# Patient Record
Sex: FEMALE | Race: WHITE | Employment: OTHER | URBAN - NONMETROPOLITAN AREA
[De-identification: names, ages, dates, MRNs, and addresses within clinical notes are randomized per-mention and may not be internally consistent; named-entity substitution may affect disease eponyms.]

---

## 2017-08-16 ENCOUNTER — HOSPITAL ENCOUNTER (OUTPATIENT)
Dept: WOMENS IMAGING | Age: 52
Discharge: HOME | End: 2017-08-16
Payer: COMMERCIAL

## 2017-08-16 DIAGNOSIS — Z12.31 VISIT FOR SCREENING MAMMOGRAM: ICD-10-CM

## 2017-08-16 PROCEDURE — 77063 BREAST TOMOSYNTHESIS BI: CPT

## 2018-04-17 ENCOUNTER — HOSPITAL ENCOUNTER (OUTPATIENT)
Dept: NON INVASIVE DIAGNOSTICS | Age: 53
Discharge: HOME | End: 2018-04-17
Payer: COMMERCIAL

## 2018-04-17 PROCEDURE — 93225 XTRNL ECG REC<48 HRS REC: CPT

## 2018-04-17 PROCEDURE — 93226 XTRNL ECG REC<48 HR SCAN A/R: CPT

## 2018-09-02 ENCOUNTER — HOSPITAL ENCOUNTER (EMERGENCY)
Age: 53
Discharge: HOME | End: 2018-09-02
Attending: EMERGENCY MEDICINE
Payer: COMMERCIAL

## 2018-09-02 VITALS
BODY MASS INDEX: 31.99 KG/M2 | OXYGEN SATURATION: 96 % | WEIGHT: 216 LBS | TEMPERATURE: 98.2 F | RESPIRATION RATE: 18 BRPM | SYSTOLIC BLOOD PRESSURE: 137 MMHG | DIASTOLIC BLOOD PRESSURE: 85 MMHG | HEART RATE: 74 BPM | HEIGHT: 69 IN
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
## 2018-09-02 PROCEDURE — 99284 EMERGENCY DEPT VISIT MOD MDM: CPT

## 2018-09-02 PROCEDURE — 96372 THER/PROPH/DIAG INJ SC/IM: CPT

## 2018-09-02 PROCEDURE — 6360000002 HC RX W HCPCS: Performed by: EMERGENCY MEDICINE

## 2018-09-02 RX ORDER — CYCLOBENZAPRINE HCL 5 MG
5 TABLET ORAL 2 TIMES DAILY PRN
Qty: 6 TABLET | Refills: 0 | Status: SHIPPED | OUTPATIENT
Start: 2018-09-02 | End: 2018-09-05

## 2018-09-02 RX ORDER — KETOROLAC TROMETHAMINE 30 MG/ML
30 INJECTION, SOLUTION INTRAMUSCULAR; INTRAVENOUS ONCE
Status: DISCONTINUED | OUTPATIENT
Start: 2018-09-02 | End: 2018-09-02 | Stop reason: HOSPADM

## 2018-09-02 RX ORDER — ORPHENADRINE CITRATE 30 MG/ML
60 INJECTION INTRAMUSCULAR; INTRAVENOUS ONCE
Status: COMPLETED | OUTPATIENT
Start: 2018-09-02 | End: 2018-09-02

## 2018-09-02 RX ADMIN — ORPHENADRINE CITRATE 60 MG: 30 INJECTION INTRAMUSCULAR; INTRAVENOUS at 12:30

## 2018-09-02 ASSESSMENT — ENCOUNTER SYMPTOMS
SHORTNESS OF BREATH: 0
NAUSEA: 0
VOMITING: 0
BLOOD IN STOOL: 0
WHEEZING: 0
ABDOMINAL PAIN: 0
SORE THROAT: 0
BACK PAIN: 0
CONSTIPATION: 0
DIARRHEA: 0
RHINORRHEA: 0
COUGH: 0

## 2018-09-02 ASSESSMENT — PAIN DESCRIPTION - FREQUENCY: FREQUENCY: CONTINUOUS

## 2018-09-02 ASSESSMENT — PAIN DESCRIPTION - LOCATION: LOCATION: SHOULDER;NECK

## 2018-09-02 ASSESSMENT — PAIN SCALES - GENERAL: PAINLEVEL_OUTOF10: 2

## 2018-09-02 ASSESSMENT — PAIN DESCRIPTION - PAIN TYPE: TYPE: ACUTE PAIN;CHRONIC PAIN

## 2018-09-02 ASSESSMENT — PAIN DESCRIPTION - ORIENTATION: ORIENTATION: RIGHT;LEFT
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
## 2018-12-27 ENCOUNTER — HOSPITAL ENCOUNTER (OUTPATIENT)
Dept: MRI IMAGING | Age: 53
Discharge: HOME | End: 2018-12-27
Payer: COMMERCIAL

## 2018-12-27 DIAGNOSIS — M46.1 SACROILIITIS (HCC): ICD-10-CM

## 2018-12-27 PROCEDURE — 72195 MRI PELVIS W/O DYE: CPT

## 2020-09-01 ENCOUNTER — HOSPITAL ENCOUNTER (OUTPATIENT)
Dept: ULTRASOUND IMAGING | Age: 55
Discharge: HOME | End: 2020-09-01
Payer: COMMERCIAL

## 2020-09-01 ENCOUNTER — HOSPITAL ENCOUNTER (OUTPATIENT)
Dept: WOMENS IMAGING | Age: 55
Discharge: HOME | End: 2020-09-01
Payer: COMMERCIAL

## 2020-09-01 PROCEDURE — 77063 BREAST TOMOSYNTHESIS BI: CPT

## 2020-09-01 PROCEDURE — 76536 US EXAM OF HEAD AND NECK: CPT

## 2020-09-11 ENCOUNTER — HOSPITAL ENCOUNTER (OUTPATIENT)
Dept: WOMENS IMAGING | Age: 55
Discharge: HOME | End: 2020-09-11
Payer: COMMERCIAL

## 2020-09-11 PROCEDURE — G0279 TOMOSYNTHESIS, MAMMO: HCPCS

## 2020-09-11 PROCEDURE — 76642 ULTRASOUND BREAST LIMITED: CPT

## 2021-09-02 ENCOUNTER — HOSPITAL ENCOUNTER (OUTPATIENT)
Dept: WOMENS IMAGING | Age: 56
Discharge: HOME | End: 2021-09-02
Payer: COMMERCIAL

## 2021-09-02 DIAGNOSIS — Z12.31 VISIT FOR SCREENING MAMMOGRAM: ICD-10-CM

## 2021-09-02 PROCEDURE — 77063 BREAST TOMOSYNTHESIS BI: CPT

## 2021-09-08 ENCOUNTER — HOSPITAL ENCOUNTER (OUTPATIENT)
Dept: WOMENS IMAGING | Age: 56
Discharge: HOME | End: 2021-09-08
Payer: COMMERCIAL

## 2021-09-08 DIAGNOSIS — N64.4 BREAST PAIN, LEFT: ICD-10-CM

## 2021-09-08 DIAGNOSIS — N64.4 BREAST PAIN: ICD-10-CM

## 2021-09-08 PROCEDURE — 76642 ULTRASOUND BREAST LIMITED: CPT

## 2022-11-02 ENCOUNTER — HOSPITAL ENCOUNTER (OUTPATIENT)
Dept: WOMENS IMAGING | Age: 57
Discharge: HOME | End: 2022-11-02
Payer: COMMERCIAL

## 2022-11-02 DIAGNOSIS — Z12.31 VISIT FOR SCREENING MAMMOGRAM: ICD-10-CM

## 2022-11-02 PROCEDURE — 77063 BREAST TOMOSYNTHESIS BI: CPT

## 2023-02-26 ENCOUNTER — HOSPITAL ENCOUNTER (EMERGENCY)
Age: 58
Discharge: HOME | End: 2023-02-26
Attending: STUDENT IN AN ORGANIZED HEALTH CARE EDUCATION/TRAINING PROGRAM
Payer: COMMERCIAL

## 2023-02-26 ENCOUNTER — APPOINTMENT (OUTPATIENT)
Dept: CT IMAGING | Age: 58
End: 2023-02-26
Payer: COMMERCIAL

## 2023-02-26 ENCOUNTER — APPOINTMENT (OUTPATIENT)
Dept: GENERAL RADIOLOGY | Age: 58
End: 2023-02-26
Payer: COMMERCIAL

## 2023-02-26 VITALS
HEART RATE: 79 BPM | TEMPERATURE: 98.3 F | OXYGEN SATURATION: 96 % | DIASTOLIC BLOOD PRESSURE: 84 MMHG | WEIGHT: 240 LBS | RESPIRATION RATE: 16 BRPM | HEIGHT: 70 IN | SYSTOLIC BLOOD PRESSURE: 132 MMHG | BODY MASS INDEX: 34.36 KG/M2

## 2023-02-26 DIAGNOSIS — R91.1 LUNG NODULE SEEN ON IMAGING STUDY: Primary | ICD-10-CM

## 2023-02-26 LAB
ALBUMIN SERPL BCG-MCNC: 3.9 G/DL (ref 3.5–5.1)
ALP SERPL-CCNC: 98 U/L (ref 38–126)
ALT SERPL W/O P-5'-P-CCNC: 73 U/L (ref 11–66)
ANION GAP SERPL CALC-SCNC: 12 MEQ/L (ref 8–16)
AST SERPL-CCNC: 88 U/L (ref 5–40)
BILIRUB CONJ SERPL-MCNC: < 0.2 MG/DL (ref 0–0.3)
BILIRUB SERPL-MCNC: 0.5 MG/DL (ref 0.3–1.2)
BUN SERPL-MCNC: 16 MG/DL (ref 7–22)
CALCIUM SERPL-MCNC: 9.5 MG/DL (ref 8.5–10.5)
CHLORIDE SERPL-SCNC: 100 MEQ/L (ref 98–111)
CO2 SERPL-SCNC: 23 MEQ/L (ref 23–33)
CREAT SERPL-MCNC: 0.8 MG/DL (ref 0.4–1.2)
D DIMER PPP IA.FEU-MCNC: 1881 NG/ML FEU (ref 0–500)
FLUAV RNA RESP QL NAA+PROBE: NOT DETECTED
FLUBV RNA RESP QL NAA+PROBE: NOT DETECTED
GFR SERPL CREATININE-BSD FRML MDRD: > 60 ML/MIN/1.73M2
GLUCOSE SERPL-MCNC: 107 MG/DL (ref 70–108)
NT-PROBNP SERPL IA-MCNC: 46.7 PG/ML (ref 0–124)
OSMOLALITY SERPL CALC.SUM OF ELEC: 271.8 MOSMOL/KG (ref 275–300)
POTASSIUM SERPL-SCNC: 3.5 MEQ/L (ref 3.5–5.2)
PROT SERPL-MCNC: 7.5 G/DL (ref 6.1–8)
SARS-COV-2 RNA RESP QL NAA+PROBE: NOT DETECTED
SCAN OF BLOOD SMEAR: NORMAL
SODIUM SERPL-SCNC: 135 MEQ/L (ref 135–145)
TROPONIN T: < 0.01 NG/ML

## 2023-02-26 PROCEDURE — 93005 ELECTROCARDIOGRAM TRACING: CPT | Performed by: EMERGENCY MEDICINE

## 2023-02-26 PROCEDURE — 83880 ASSAY OF NATRIURETIC PEPTIDE: CPT

## 2023-02-26 PROCEDURE — 85025 COMPLETE CBC W/AUTO DIFF WBC: CPT

## 2023-02-26 PROCEDURE — 93010 ELECTROCARDIOGRAM REPORT: CPT | Performed by: NUCLEAR MEDICINE

## 2023-02-26 PROCEDURE — 36415 COLL VENOUS BLD VENIPUNCTURE: CPT

## 2023-02-26 PROCEDURE — 6360000004 HC RX CONTRAST MEDICATION

## 2023-02-26 PROCEDURE — 84484 ASSAY OF TROPONIN QUANT: CPT

## 2023-02-26 PROCEDURE — 71046 X-RAY EXAM CHEST 2 VIEWS: CPT

## 2023-02-26 PROCEDURE — 87636 SARSCOV2 & INF A&B AMP PRB: CPT

## 2023-02-26 PROCEDURE — 80053 COMPREHEN METABOLIC PANEL: CPT

## 2023-02-26 PROCEDURE — 85379 FIBRIN DEGRADATION QUANT: CPT

## 2023-02-26 PROCEDURE — 99285 EMERGENCY DEPT VISIT HI MDM: CPT

## 2023-02-26 PROCEDURE — 82248 BILIRUBIN DIRECT: CPT

## 2023-02-26 PROCEDURE — 71275 CT ANGIOGRAPHY CHEST: CPT

## 2023-02-26 RX ORDER — ADALIMUMAB 40MG/0.4ML
KIT SUBCUTANEOUS
COMMUNITY

## 2023-02-26 RX ORDER — MELOXICAM 7.5 MG/1
7.5 TABLET ORAL DAILY
COMMUNITY

## 2023-02-26 RX ORDER — FOLIC ACID 1 MG/1
1 TABLET ORAL DAILY
COMMUNITY

## 2023-02-26 RX ORDER — MULTIVIT WITH MINERALS/LUTEIN
1000 TABLET ORAL DAILY
COMMUNITY

## 2023-02-26 RX ADMIN — IOPAMIDOL 80 ML: 755 INJECTION, SOLUTION INTRAVENOUS at 13:33

## 2023-02-26 ASSESSMENT — PAIN SCALES - GENERAL
PAINLEVEL_OUTOF10: 2
PAINLEVEL_OUTOF10: 2

## 2023-02-26 ASSESSMENT — PAIN DESCRIPTION - LOCATION
LOCATION: BACK
LOCATION: BACK

## 2023-02-26 ASSESSMENT — PAIN - FUNCTIONAL ASSESSMENT
PAIN_FUNCTIONAL_ASSESSMENT: 0-10
PAIN_FUNCTIONAL_ASSESSMENT: 0-10

## 2023-02-26 ASSESSMENT — PAIN DESCRIPTION - ORIENTATION: ORIENTATION: MID

## 2023-02-26 NOTE — ED NOTES
Patient resting on cart. Patient complains of shortness of breath. Respirations regular and unlabored. Patient updated on plan of additional blood lab.  at bedside. Call light in reach.      Debi Carrillo RN  02/26/23 4472

## 2023-02-26 NOTE — ED NOTES
Patient resting on cart. Patient updated on plan of care and pending CTA study. Patient denies further needs at this time. Respirations unlabored at this time. Call light in reach.      Kayla Smiley RN  02/26/23 2945

## 2023-02-26 NOTE — ED PROVIDER NOTES
[facility] [address] [address] EMERGENCY DEPT      EMERGENCY MEDICINE     Pt Name: Christal Romo  MRN: [id number]  [place] 1965  Date of evaluation: 2/26/2023  Resident Physician: Juana Mcguire MD  Attending Physician: Dr. Aravind Echeverria       Chief Complaint   Patient presents with    Shortness of Breath     HISTORY OF PRESENT ILLNESS   Christal Romo is a 61 y.o. female who presents to the emergency department from home, by private vehicle for evaluation of shortness of breath and fatigue    Patient states that she has rheumatoid arthritis and is taking Humira as well as methotrexate combined with folic acid but does occasionally become severely anemic which causes her symptoms that she is experiencing now and thinks that is the case. Patient says roughly every 3 to 6 months she does get iron injections through Dr. Le Rosario office for this. Patient has not had any recent URIs, chest pain, abdominal pain, diarrhea or dysuria. Patient is denying any coughs. Patient denies any recent sick contacts. Patient is a non-smoker denies any alcohol or recreational drug use. Patient without any recent surgeries. Patient is not on any estrogen therapy. Patient has never had a blood clot in the legs or lungs before. Patient denies history of cancer. Patient says her only other medical history is hypertension for which she takes blood pressure medication for. The patient has no other acute complaints at this time. Review of Systems    Negative Except as Documented Above. PASTMEDICAL HISTORY     Past Medical History:   Diagnosis Date    Anxiety     Arthritis     BRCA1 negative 11/15/2016    Myriad 28 gene panel-NEGATIVE    BRCA2 negative 11/15/2016    Cervical pain     Depression     GERD (gastroesophageal reflux disease)     R[facility] ([facility]        There is no problem list on file for this patient.     SURGICAL HISTORY       Past Surgical History:   Procedure Laterality Date    APPENDECTOMY HYSTERECTOMY (CERVIX STATUS UNKNOWN)         CURRENT MEDICATIONS       Current Discharge Medication List        CONTINUE these medications which have NOT CHANGED    Details   meloxicam (MOBIC) 7.5 MG tablet Take 7.5 mg by mouth daily      folic acid (FOLVITE) 1 MG tablet Take 1 mg by mouth daily      vitamin E 1000 units capsule Take 1,000 Units by mouth daily      methotrexate (RHEUMATREX) 2.5 MG chemo tablet Take 10 mg by mouth once a week      Adalimumab (HUMIRA PEN) 40 MG/0.4ML PNKT Inject into the skin every 14 days      metaxalone (SKELAXIN) 800 MG tablet Take 800 mg by mouth 3 times daily      sertraline (ZOLOFT) 100 MG tablet Take 50 mg by mouth daily      pantoprazole (PROTONIX) 20 MG tablet Take 40 mg by mouth daily             ALLERGIES     is allergic to morphine. FAMILY HISTORY     She indicated that her mother is . She indicated that her father is . She indicated that the status of her brother is unknown. She indicated that the status of her maternal grandmother is unknown. She indicated that the status of her maternal grandfather is unknown. She indicated that the status of her paternal grandfather is unknown. She indicated that her maternal aunt is alive. She indicated that her paternal aunt is alive. She indicated that the status of her maternal cousin is unknown. SOCIAL HISTORY       Social History     Tobacco Use    Smoking status: Some Days    Smokeless tobacco: Never   Substance Use Topics    Alcohol use: Yes     Comment: occas    Drug use: No       PHYSICAL EXAM       ED Triage Vitals [23 0920]   BP Temp Temp Source Heart Rate Resp SpO2 Height Weight   128/81 98.3 °F (36.8 °C) Oral 88 16 97 % 5' 10\" (1.778 m) 240 lb (108.9 kg)       Physical Exam  General: Lying in bed no obvious distress  HEENT:  normocephalic and atraumatic. No scleral icterus. PERR. EOMI moist mucous membranes  Neck: Supple. No JVD. No thyromegaly. Lungs: clear to auscultation.   No retractions, No evidence of Respiratory Distress   Cardiac: RRR without MGR, Bilateral Radial and DP pulses 2+  Abdomen: soft. Nontender. Nondistended, Bowel Sounds Present  Extremities:  No clubbing, cyanosis, or edema x 4. Cap Refill < 2 Seconds    Skin:  warm and dry. No rashes or bruises  Psych:  Alert and oriented x3. Affect appropriate  Lymph:  No supraclavicular adenopathy. Neurologic:  No focal deficit. No seizures. FORMAL DIAGNOSTIC RESULTS     RADIOLOGY: Interpretation per the Radiologist below, if available at the time of this note (none if blank):    CTA CHEST W WO CONTRAST PE Eval   Final Result       1. No pulmonary emboli. 2. New 1.2 cm right middle lobe pulmonary nodule. There is some surrounding groundglass attenuation. There is associated right hilar and mediastinal adenopathy. While this could be infectious/inflammatory in nature, malignancy is a consideration. A 3    month follow up chest CT or PET/CT is recommended for further evaluation. **This report has been created using voice recognition software. It may contain minor errors which are inherent in voice recognition technology. **      Final report electronically signed by Dr. Dariana Geronimo on 2/26/2023 1:48 PM      XR CHEST (2 VW)   Final Result   Normal chest.               **This report has been created using voice recognition software. It may contain minor errors which are inherent in voice recognition technology. **      Final report electronically signed by Dr. Dariana Geronimo on 2/26/2023 10:10 AM          LABS: (none if blank)  Labs Reviewed   CBC WITH AUTO DIFFERENTIAL - Abnormal; Notable for the following components:       Result Value    RDW-SD 49.1 (*)     All other components within normal limits   HEPATIC FUNCTION PANEL - Abnormal; Notable for the following components:    AST 88 (*)     ALT 73 (*)     All other components within normal limits   OSMOLALITY - Abnormal; Notable for the following components: Osmolality Calc 271.8 (*)     All other components within normal limits   D-DIMER, QUANTITATIVE - Abnormal; Notable for the following components:    D-Dimer, Quant 1881.00 (*)     All other components within normal limits   COVID-19 & INFLUENZA COMBO   BRAIN NATRIURETIC PEPTIDE   BASIC METABOLIC PANEL   TROPONIN   ANION GAP   GLOMERULAR FILTRATION RATE, ESTIMATED   SCAN OF BLOOD SMEAR       (Any cultures that may have been sent were not resulted at the time of this patient visit)    [address] / ED COURSE:     1) Number and Complexity of Problems            Problem List This Visit:         Chief Complaint   Patient presents with    Shortness of Breath            Differential Diagnosis includes (but not limited to): COVID, flu, pneumonia, other viral URI, CHF, COPD, pneumothorax, restrictive lung diseases, anemia, electrolyte disturbances                 Pertinent Comorbid Conditions:    Rheumatoid arthritis on methotrexate and Humira    2)  Data Reviewed (none if left blank)          My Independent interpretations:     EKG:      Normal sinus rhythm with normal rates and intervals    Imaging: See Ed Course    Labs:      See Ed Course                 Decision Rules/Clinical Scores utilized:              External Documentation Reviewed:         Previous patient encounter documents & history available on EMR was reviewed              See Formal Diagnostic Results above for the lab and radiology tests and orders. 3)  Treatment and Disposition         ED Reassessment: Upon reassessment, patient updated on labs and need to get additional laboratory test to screen for pulmonary embolism. Patient expressed understanding and agreement. Patient has no further complaints this time. Please see ED course for further reassessments, treatments, MDM, and final disposition.             Case discussed with consulting clinician:           Shared Decision-Making was performed and disposition discussed with the Patient/Family and questions answered          Social determinants of health impacting treatment or disposition:           Code Status:        Summary of Patient Presentation:      MDM   /   ED Course as of 02/26/23 1430   Sun Feb 26, 2023   1427 Patient's initial work-up was negative so we added on a D-dimer to screen for possible pulmonary embolism. When I came back elevated we did get a CTA of the chest.  No PE was identified but there was a lung nodule present that was read as infectious versus inflammatory versus malignancy. I discussed these results with the patient at length and the importance of follow-up. Discussed with him the recommendation of repeat CT scan in 3 months as well as following up with her oncologist Dr. Radha Orona. Patient understandably tearful and frustrated. Patient's vitals have been normal the entire time with no discernible cause of the patient's subjective shortness of breath other than this lung nodule. Shared decision-making was performed and patient does agree that she is suitable for discharge at this time. Patient understands follow-up instructions and is able to repeat them back to me. Patient has no further concerns at this time. [BENI]      ED Course User Index  [BENI] Desire Villagran MD     Vitals Reviewed:    Vitals:    02/26/23 0920 02/26/23 1034 02/26/23 1145 02/26/23 1230   BP: 128/81 127/78 130/79 132/86   Pulse: 88 77 81 82   Resp: 16 16     Temp: 98.3 °F (36.8 °C)      TempSrc: Oral      SpO2: 97% 98% 96% 96%   Weight: 240 lb (108.9 kg)      Height: 5' 10\" (1.778 m)          The patient was seen and examined. Appropriate diagnostic testing was performed and results reviewed with the patient. The results of pertinent diagnostic studies and exam findings were discussed. The patients provisional diagnosis and plan of care were discussed with the patient and present family who expressed understanding.  Any medications were reviewed and indications and risks of medications were discussed with the patient /family present. Strict verbal and written return precautions, instructions and appropriate follow-up provided to  the patient . ED Medications administered this visit:  (None if blank)  Medications   iopamidol (ISOVUE-370) 76 % injection 80 mL (80 mLs IntraVENous Given 2/26/23 1333)         PROCEDURES: (None if blank)  Procedures:     CRITICAL CARE: (Please see Attending note / Alton Cheung regarding Critical Care Time. )      DISCHARGE PRESCRIPTIONS: (None if blank)  Current Discharge Medication List          FINAL IMPRESSION      1. Lung nodule seen on imaging study          DISPOSITION/PLAN   DISPOSITION Decision To Discharge 02/26/2023 02:20:49 PM      OUTPATIENT FOLLOW UP THE PATIENT:  [facility] EMERGENCY DEPT  [address] [address]  [phone]  Go to   If symptoms worsen    Tim Malave MD  [address] [place] [address]  [phone]    Call in 3 days      [address]  [address] [place] [address] [address] [address]  [phone]    Call in 1 day      Daymon Kocher, MD    This transcription was electronically signed. Parts of this transcriptions may have been dictated by use of voice recognition software and electronically transcribed, and parts may have been transcribed with the assistance of an ED scribe. The transcription may contain errors not detected in proofreading. Please refer to my supervising physician's documentation if my documentation differs.     Electronically Signed: Daymon Kocher, MD, 02/26/23, 2:30 PM        Eloisa De Paz MD  Resident  02/26/23 4137

## 2023-02-26 NOTE — DISCHARGE INSTRUCTIONS
Your labs today do not demonstrate any significant pathology however CT scan of your chest does demonstrate a lung nodule that is concerning for inflammatory, infectious and possible malignancy. You will need a repeat CT scan in 3 months to reevaluate as well as possible biopsy depending on the results of that. It will be important to follow-up closely with your primary care doctor and your oncologist following this visit today for further work-up.

## 2023-02-27 LAB
AUTO DIFF PNL BLD: ABNORMAL
BASOPHILS ABSOLUTE: 0 THOU/MM3 (ref 0–0.1)
BASOPHILS NFR BLD AUTO: 0.5 %
DEPRECATED RDW RBC AUTO: 49.1 FL (ref 35–45)
EKG ATRIAL RATE: 90 BPM
EKG P AXIS: 50 DEGREES
EKG P-R INTERVAL: 174 MS
EKG Q-T INTERVAL: 348 MS
EKG QRS DURATION: 84 MS
EKG QTC CALCULATION (BAZETT): 425 MS
EKG R AXIS: 93 DEGREES
EKG T AXIS: 24 DEGREES
EKG VENTRICULAR RATE: 90 BPM
EOSINOPHIL NFR BLD AUTO: 0.2 %
EOSINOPHILS ABSOLUTE: 0 THOU/MM3 (ref 0–0.4)
ERYTHROCYTE [DISTWIDTH] IN BLOOD BY AUTOMATED COUNT: 14.4 % (ref 11.5–14.5)
HCT VFR BLD AUTO: 40.5 % (ref 37–47)
HGB BLD-MCNC: 13.3 GM/DL (ref 12–16)
IMM GRANULOCYTES # BLD AUTO: 0.05 THOU/MM3 (ref 0–0.07)
IMM GRANULOCYTES NFR BLD AUTO: 0.8 %
LYMPHOCYTES ABSOLUTE: 3.9 THOU/MM3 (ref 1–4.8)
LYMPHOCYTES NFR BLD AUTO: 60.9 %
MCH RBC QN AUTO: 30.7 PG (ref 26–33)
MCHC RBC AUTO-ENTMCNC: 32.8 GM/DL (ref 32.2–35.5)
MCV RBC AUTO: 93.5 FL (ref 81–99)
MONOCYTES ABSOLUTE: 0.3 THOU/MM3 (ref 0.4–1.3)
MONOCYTES NFR BLD AUTO: 4.8 %
NEUTROPHILS NFR BLD AUTO: 32.8 %
NRBC BLD AUTO-RTO: 0 /100 WBC
PATHOLOGIST REVIEW: ABNORMAL
PLATELET # BLD AUTO: 172 THOU/MM3 (ref 130–400)
PLATELET BLD QL SMEAR: ADEQUATE
PMV BLD AUTO: 9.3 FL (ref 9.4–12.4)
RBC # BLD AUTO: 4.33 MILL/MM3 (ref 4.2–5.4)
SEGMENTED NEUTROPHILS ABSOLUTE COUNT: 2.1 THOU/MM3 (ref 1.8–7.7)
VARIANT LYMPHS BLD QL SMEAR: ABNORMAL %
WBC # BLD AUTO: 6.4 THOU/MM3 (ref 4.8–10.8)

## 2023-10-11 ENCOUNTER — HOSPITAL ENCOUNTER (OUTPATIENT)
Dept: CT IMAGING | Age: 58
Discharge: HOME | End: 2023-10-11
Attending: INTERNAL MEDICINE
Payer: COMMERCIAL

## 2023-10-11 DIAGNOSIS — R51.9 LEFT-SIDED HEADACHE: ICD-10-CM

## 2023-10-11 DIAGNOSIS — R91.1 PULMONARY NODULE: ICD-10-CM

## 2023-10-11 DIAGNOSIS — R51.0 ORTHOSTATIC HEADACHE: ICD-10-CM

## 2023-10-11 PROCEDURE — 70450 CT HEAD/BRAIN W/O DYE: CPT

## 2023-10-11 PROCEDURE — 71250 CT THORAX DX C-: CPT

## 2023-10-23 ENCOUNTER — APPOINTMENT (OUTPATIENT)
Dept: CT IMAGING | Age: 58
End: 2023-10-23
Payer: COMMERCIAL

## 2023-10-23 ENCOUNTER — HOSPITAL ENCOUNTER (EMERGENCY)
Age: 58
Discharge: HOME | End: 2023-10-23
Attending: EMERGENCY MEDICINE
Payer: COMMERCIAL

## 2023-10-23 VITALS
DIASTOLIC BLOOD PRESSURE: 100 MMHG | WEIGHT: 225 LBS | OXYGEN SATURATION: 98 % | RESPIRATION RATE: 16 BRPM | HEIGHT: 70 IN | SYSTOLIC BLOOD PRESSURE: 156 MMHG | BODY MASS INDEX: 32.21 KG/M2 | HEART RATE: 76 BPM | TEMPERATURE: 98.4 F

## 2023-10-23 DIAGNOSIS — R91.1 PULMONARY NODULE: ICD-10-CM

## 2023-10-23 DIAGNOSIS — N20.0 KIDNEY STONE: Primary | ICD-10-CM

## 2023-10-23 LAB
ALBUMIN SERPL BCG-MCNC: 4.2 G/DL (ref 3.5–5.1)
ALP SERPL-CCNC: 99 U/L (ref 38–126)
ALT SERPL W/O P-5'-P-CCNC: 13 U/L (ref 11–66)
ANION GAP SERPL CALC-SCNC: 13 MEQ/L (ref 8–16)
AST SERPL-CCNC: 18 U/L (ref 5–40)
B-HCG SERPL QL: NEGATIVE
BACTERIA URNS QL MICRO: ABNORMAL /HPF
BASOPHILS ABSOLUTE: 0 THOU/MM3 (ref 0–0.1)
BASOPHILS NFR BLD AUTO: 0.4 %
BILIRUB CONJ SERPL-MCNC: < 0.2 MG/DL (ref 0–0.3)
BILIRUB SERPL-MCNC: 0.3 MG/DL (ref 0.3–1.2)
BILIRUB UR QL STRIP.AUTO: NEGATIVE
BUN SERPL-MCNC: 19 MG/DL (ref 7–22)
CALCIUM SERPL-MCNC: 9.5 MG/DL (ref 8.5–10.5)
CASTS #/AREA URNS LPF: ABNORMAL /LPF
CASTS 2: ABNORMAL /LPF
CHARACTER UR: CLEAR
CHLORIDE SERPL-SCNC: 100 MEQ/L (ref 98–111)
CO2 SERPL-SCNC: 23 MEQ/L (ref 23–33)
COLOR: YELLOW
CREAT SERPL-MCNC: 0.8 MG/DL (ref 0.4–1.2)
CRP SERPL-MCNC: 2.19 MG/DL (ref 0–1)
CRYSTALS URNS MICRO: ABNORMAL
DEPRECATED RDW RBC AUTO: 48.5 FL (ref 35–45)
EOSINOPHIL NFR BLD AUTO: 4.1 %
EOSINOPHILS ABSOLUTE: 0.2 THOU/MM3 (ref 0–0.4)
EPITHELIAL CELLS, UA: ABNORMAL /HPF
ERYTHROCYTE [DISTWIDTH] IN BLOOD BY AUTOMATED COUNT: 13.8 % (ref 11.5–14.5)
GFR SERPL CREATININE-BSD FRML MDRD: > 60 ML/MIN/1.73M2
GLUCOSE SERPL-MCNC: 134 MG/DL (ref 70–108)
GLUCOSE UR QL STRIP.AUTO: NEGATIVE MG/DL
HCT VFR BLD AUTO: 38.9 % (ref 37–47)
HGB BLD-MCNC: 12.6 GM/DL (ref 12–16)
HGB UR QL STRIP.AUTO: ABNORMAL
IMM GRANULOCYTES # BLD AUTO: 0.02 THOU/MM3 (ref 0–0.07)
IMM GRANULOCYTES NFR BLD AUTO: 0.4 %
KETONES UR QL STRIP.AUTO: NEGATIVE
LIPASE SERPL-CCNC: 33.8 U/L (ref 5.6–51.3)
LYMPHOCYTES ABSOLUTE: 1.2 THOU/MM3 (ref 1–4.8)
LYMPHOCYTES NFR BLD AUTO: 22 %
MCH RBC QN AUTO: 31.3 PG (ref 26–33)
MCHC RBC AUTO-ENTMCNC: 32.4 GM/DL (ref 32.2–35.5)
MCV RBC AUTO: 96.5 FL (ref 81–99)
MISCELLANEOUS 2: ABNORMAL
MONOCYTES ABSOLUTE: 0.3 THOU/MM3 (ref 0.4–1.3)
MONOCYTES NFR BLD AUTO: 6.4 %
NEUTROPHILS NFR BLD AUTO: 66.7 %
NITRITE UR QL STRIP: NEGATIVE
NRBC BLD AUTO-RTO: 0 /100 WBC
OSMOLALITY SERPL CALC.SUM OF ELEC: 276.2 MOSMOL/KG (ref 275–300)
PH UR STRIP.AUTO: 7 [PH] (ref 5–9)
PLATELET # BLD AUTO: 243 THOU/MM3 (ref 130–400)
PMV BLD AUTO: 9.3 FL (ref 9.4–12.4)
POTASSIUM SERPL-SCNC: 3.7 MEQ/L (ref 3.5–5.2)
PROT SERPL-MCNC: 7.9 G/DL (ref 6.1–8)
PROT UR STRIP.AUTO-MCNC: NEGATIVE MG/DL
RBC # BLD AUTO: 4.03 MILL/MM3 (ref 4.2–5.4)
RBC URINE: ABNORMAL /HPF
RENAL EPI CELLS #/AREA URNS HPF: ABNORMAL /[HPF]
SEGMENTED NEUTROPHILS ABSOLUTE COUNT: 3.5 THOU/MM3 (ref 1.8–7.7)
SODIUM SERPL-SCNC: 136 MEQ/L (ref 135–145)
SP GR UR REFRACT.AUTO: > 1.03 (ref 1–1.03)
UROBILINOGEN, URINE: 0.2 EU/DL (ref 0–1)
WBC # BLD AUTO: 5.3 THOU/MM3 (ref 4.8–10.8)
WBC #/AREA URNS HPF: ABNORMAL /HPF
WBC #/AREA URNS HPF: NEGATIVE /[HPF]
YEAST LIKE FUNGI URNS QL MICRO: ABNORMAL

## 2023-10-23 PROCEDURE — 85025 COMPLETE CBC W/AUTO DIFF WBC: CPT

## 2023-10-23 PROCEDURE — 86140 C-REACTIVE PROTEIN: CPT

## 2023-10-23 PROCEDURE — 84703 CHORIONIC GONADOTROPIN ASSAY: CPT

## 2023-10-23 PROCEDURE — 82248 BILIRUBIN DIRECT: CPT

## 2023-10-23 PROCEDURE — 74177 CT ABD & PELVIS W/CONTRAST: CPT

## 2023-10-23 PROCEDURE — 6360000002 HC RX W HCPCS: Performed by: STUDENT IN AN ORGANIZED HEALTH CARE EDUCATION/TRAINING PROGRAM

## 2023-10-23 PROCEDURE — 80053 COMPREHEN METABOLIC PANEL: CPT

## 2023-10-23 PROCEDURE — 36415 COLL VENOUS BLD VENIPUNCTURE: CPT

## 2023-10-23 PROCEDURE — 81001 URINALYSIS AUTO W/SCOPE: CPT

## 2023-10-23 PROCEDURE — 2580000003 HC RX 258: Performed by: STUDENT IN AN ORGANIZED HEALTH CARE EDUCATION/TRAINING PROGRAM

## 2023-10-23 PROCEDURE — 96374 THER/PROPH/DIAG INJ IV PUSH: CPT

## 2023-10-23 PROCEDURE — 6360000004 HC RX CONTRAST MEDICATION: Performed by: EMERGENCY MEDICINE

## 2023-10-23 PROCEDURE — 83690 ASSAY OF LIPASE: CPT

## 2023-10-23 PROCEDURE — 99285 EMERGENCY DEPT VISIT HI MDM: CPT

## 2023-10-23 RX ORDER — ONDANSETRON 2 MG/ML
4 INJECTION INTRAMUSCULAR; INTRAVENOUS ONCE
Status: COMPLETED | OUTPATIENT
Start: 2023-10-23 | End: 2023-10-23

## 2023-10-23 RX ORDER — 0.9 % SODIUM CHLORIDE 0.9 %
500 INTRAVENOUS SOLUTION INTRAVENOUS ONCE
Status: COMPLETED | OUTPATIENT
Start: 2023-10-23 | End: 2023-10-23

## 2023-10-23 RX ADMIN — SODIUM CHLORIDE 500 ML: 9 INJECTION, SOLUTION INTRAVENOUS at 03:59

## 2023-10-23 RX ADMIN — ONDANSETRON 4 MG: 2 INJECTION INTRAMUSCULAR; INTRAVENOUS at 03:57

## 2023-10-23 RX ADMIN — IOPAMIDOL 80 ML: 755 INJECTION, SOLUTION INTRAVENOUS at 03:48

## 2023-10-23 ASSESSMENT — PAIN DESCRIPTION - PAIN TYPE: TYPE: ACUTE PAIN

## 2023-10-23 ASSESSMENT — PAIN - FUNCTIONAL ASSESSMENT: PAIN_FUNCTIONAL_ASSESSMENT: 0-10

## 2023-10-23 ASSESSMENT — PAIN DESCRIPTION - LOCATION: LOCATION: ABDOMEN

## 2023-10-23 ASSESSMENT — PAIN DESCRIPTION - ORIENTATION: ORIENTATION: RIGHT;LOWER

## 2023-10-23 ASSESSMENT — PAIN SCALES - GENERAL: PAINLEVEL_OUTOF10: 4

## 2023-10-23 NOTE — ED NOTES
Pt to ED with SO for complaint of abdominal pain with emesis x3 that began 30 minutes prior to arrival as pt states she awoke with symptoms. Symptoms not present prior to pt going to sleep last night. Pt states pain 4/10 at this time and lower right quadrant with history of appendix removal.  Pt states no further symptoms or complaints, no distress noted as breaths easy and unlabored. Call light in reach.        Davie Page, [address]  10/23/23 8003

## 2023-10-23 NOTE — DISCHARGE INSTRUCTIONS
Return to the emergency department if you experience worsening pain, vomiting, fevers if you have any other concerns. Your CAT scan today showed a kidney stone which is passed into the bladder.

## 2023-10-23 NOTE — ED PROVIDER NOTES
[facility] EMERGENCY DEPT    Pt Name: Luann Bañuelos  MRN: [id number]  [address] [facility] 1965  Date of evaluation: 10/23/2023  Resident Physician: Zachariah Rebolledo MD  Attending Physician: El Bearden DO      CHIEF COMPLAINT       Chief Complaint   Patient presents with    Abdominal Pain     Lower right      HISTORY OF PRESENT ILLNESS   Luann Bañuelos is a 60 y.o. female with PMHx of rheumatoid arthritis on Humira and methotrexate  who presents to the emergency department for evaluation of abdominal pain. Patient is a past surgical history of appendectomy and hysterectomy. Patient states that she had ovarian cysts in the past and history of endometriosis. Patient states that approxi-1 hour prior to ED arrival she woke up from sleep with sharp right lower quadrant abdominal pain. States that radiated to the groin. States that the pain is actually improved in route to the emergency department. States that she had 3 episodes of nonbloody nonbilious emesis accompanied with nausea. Denies fevers, chills, chest pain or shortness of breath. [place] dysuria, hematuria or vaginal discharge. Patient denies flank pain. The patient has no other acute complaints at this time. PASTMEDICAL HISTORY     Past Medical History:   Diagnosis Date    Anxiety     Arthritis     BRCA1 negative 11/15/2016    Myriad 28 gene panel-NEGATIVE    BRCA2 negative 11/15/2016    Cervical pain     Depression     GERD (gastroesophageal reflux disease)     Rheumatoid arthritis ([address])        There is no problem list on file for this patient.     SURGICAL HISTORY       Past Surgical History:   Procedure Laterality Date    APPENDECTOMY      HYSTERECTOMY (CERVIX STATUS UNKNOWN)  2006       CURRENT MEDICATIONS       Discharge Medication List as of 10/23/2023  5:21 AM        CONTINUE these medications which have NOT CHANGED    Details   meloxicam (MOBIC) 7.5 MG tablet Take 7.5 mg by mouth dailyHistorical Med      folic acid (FOLVITE) 1 MG tablet Take

## 2023-11-08 ENCOUNTER — HOSPITAL ENCOUNTER (OUTPATIENT)
Dept: WOMENS IMAGING | Age: 58
Discharge: HOME | End: 2023-11-08
Payer: COMMERCIAL

## 2023-11-08 VITALS — WEIGHT: 225 LBS | HEIGHT: 70 IN | BODY MASS INDEX: 32.21 KG/M2

## 2023-11-08 DIAGNOSIS — Z12.31 VISIT FOR SCREENING MAMMOGRAM: ICD-10-CM

## 2023-11-08 PROCEDURE — 77063 BREAST TOMOSYNTHESIS BI: CPT

## 2023-11-14 ENCOUNTER — HOSPITAL ENCOUNTER (OUTPATIENT)
Dept: WOMENS IMAGING | Age: 58
Discharge: HOME | End: 2023-11-14
Attending: RADIOLOGY
Payer: COMMERCIAL

## 2023-11-14 DIAGNOSIS — R92.1 BREAST CALCIFICATION, LEFT: ICD-10-CM

## 2023-11-14 PROCEDURE — G0279 TOMOSYNTHESIS, MAMMO: HCPCS

## 2023-11-16 ENCOUNTER — CLINICAL DOCUMENTATION (OUTPATIENT)
Dept: WOMENS IMAGING | Age: 58
End: 2023-11-16

## 2023-11-16 NOTE — PROGRESS NOTES
11/16/23  - request was faxed from [facility] , they are requesting images of mammograms. Patient is scheduled here for a biopsy on 11/28/23. She has a daughter who works at [facility]. Patient will call us if she decides to go there instead.

## 2024-10-29 ENCOUNTER — HOSPITAL ENCOUNTER (OUTPATIENT)
Dept: CT IMAGING | Age: 59
Discharge: HOME | End: 2024-10-29
Attending: INTERNAL MEDICINE
Payer: COMMERCIAL

## 2024-10-29 DIAGNOSIS — R91.1 PULMONARY NODULE: ICD-10-CM

## 2024-10-29 PROCEDURE — 71250 CT THORAX DX C-: CPT

## 2025-04-08 ENCOUNTER — HOSPITAL ENCOUNTER (EMERGENCY)
Age: 60
Discharge: HOME | End: 2025-04-08
Payer: COMMERCIAL

## 2025-04-08 VITALS
TEMPERATURE: 98 F | HEART RATE: 71 BPM | SYSTOLIC BLOOD PRESSURE: 132 MMHG | DIASTOLIC BLOOD PRESSURE: 87 MMHG | OXYGEN SATURATION: 97 % | RESPIRATION RATE: 18 BRPM

## 2025-04-08 DIAGNOSIS — B02.8 HERPES ZOSTER WITH COMPLICATION: Primary | ICD-10-CM

## 2025-04-08 PROCEDURE — 99213 OFFICE O/P EST LOW 20 MIN: CPT

## 2025-04-08 PROCEDURE — 99203 OFFICE O/P NEW LOW 30 MIN: CPT

## 2025-04-08 RX ORDER — VALACYCLOVIR HYDROCHLORIDE 500 MG/1
500 TABLET, FILM COATED ORAL DAILY
COMMUNITY

## 2025-04-08 RX ORDER — VALACYCLOVIR HYDROCHLORIDE 1 G/1
1000 TABLET, FILM COATED ORAL 3 TIMES DAILY
Qty: 21 TABLET | Refills: 0 | Status: SHIPPED | OUTPATIENT
Start: 2025-04-08 | End: 2025-04-15

## 2025-04-08 ASSESSMENT — ENCOUNTER SYMPTOMS
EYE ITCHING: 1
PHOTOPHOBIA: 0
EYE REDNESS: 1

## 2025-04-08 ASSESSMENT — PAIN - FUNCTIONAL ASSESSMENT: PAIN_FUNCTIONAL_ASSESSMENT: NONE - DENIES PAIN

## 2025-04-08 NOTE — DISCHARGE INSTRUCTIONS
Valtrex 1g (1000 mg) three times daily for seven days.  Avoid itching and scratching eyes.  Wipe from inside to outside of eye.  OTC eye moisture drops.  OTC Zyrtec.   To eye provider.

## 2025-04-08 NOTE — ED PROVIDER NOTES
JULIANA Phillips CNP    (Please note that portions of this note were completed with a voice recognition program. Efforts were made to edit the dictations but occasionally words are mis-transcribed.)            Ayanna Arenas APRN - CNP  04/08/25 3300

## 2025-04-08 NOTE — ED TRIAGE NOTES
Patient here with complaint of swelling around he right eye. Patient states that she has a hx of herpes and this is normally around this time of year.

## 2025-05-29 ENCOUNTER — TRANSCRIBE ORDERS (OUTPATIENT)
Dept: ADMINISTRATIVE | Age: 60
End: 2025-05-29

## 2025-05-29 DIAGNOSIS — Z12.31 ENCOUNTER FOR SCREENING MAMMOGRAM FOR MALIGNANT NEOPLASM OF BREAST: Primary | ICD-10-CM

## 2025-08-11 ENCOUNTER — TRANSCRIBE ORDERS (OUTPATIENT)
Dept: ADMINISTRATIVE | Age: 60
End: 2025-08-11

## 2025-08-11 DIAGNOSIS — R20.0 NUMBNESS AND TINGLING: ICD-10-CM

## 2025-08-11 DIAGNOSIS — M54.2 CERVICAL PAIN: Primary | ICD-10-CM

## 2025-08-11 DIAGNOSIS — R20.2 NUMBNESS AND TINGLING: ICD-10-CM

## 2025-08-19 ENCOUNTER — HOSPITAL ENCOUNTER (OUTPATIENT)
Dept: MRI IMAGING | Age: 60
Discharge: HOME | End: 2025-08-19
Attending: FAMILY MEDICINE
Payer: COMMERCIAL

## 2025-08-19 DIAGNOSIS — R20.0 NUMBNESS AND TINGLING: ICD-10-CM

## 2025-08-19 DIAGNOSIS — M54.2 CERVICAL PAIN: ICD-10-CM

## 2025-08-19 DIAGNOSIS — R20.2 NUMBNESS AND TINGLING: ICD-10-CM

## 2025-08-19 PROCEDURE — 72141 MRI NECK SPINE W/O DYE: CPT
